# Patient Record
Sex: FEMALE | Race: WHITE | URBAN - METROPOLITAN AREA
[De-identification: names, ages, dates, MRNs, and addresses within clinical notes are randomized per-mention and may not be internally consistent; named-entity substitution may affect disease eponyms.]

---

## 2022-04-13 ENCOUNTER — WEB ENCOUNTER (OUTPATIENT)
Dept: URBAN - METROPOLITAN AREA CLINIC 25 | Facility: CLINIC | Age: 82
End: 2022-04-13

## 2022-04-15 ENCOUNTER — OFFICE VISIT (OUTPATIENT)
Dept: URBAN - METROPOLITAN AREA CLINIC 25 | Facility: CLINIC | Age: 82
End: 2022-04-15

## 2022-04-25 ENCOUNTER — TELEPHONE ENCOUNTER (OUTPATIENT)
Dept: URBAN - METROPOLITAN AREA CLINIC 25 | Facility: CLINIC | Age: 82
End: 2022-04-25

## 2022-04-25 ENCOUNTER — LAB OUTSIDE AN ENCOUNTER (OUTPATIENT)
Dept: URBAN - METROPOLITAN AREA CLINIC 25 | Facility: CLINIC | Age: 82
End: 2022-04-25

## 2022-04-25 ENCOUNTER — OFFICE VISIT (OUTPATIENT)
Dept: URBAN - METROPOLITAN AREA CLINIC 25 | Facility: CLINIC | Age: 82
End: 2022-04-25
Payer: MEDICARE

## 2022-04-25 DIAGNOSIS — K86.89 DILATED PANCREATIC DUCT: ICD-10-CM

## 2022-04-25 DIAGNOSIS — Z79.01 ANTICOAGULANT LONG-TERM USE: ICD-10-CM

## 2022-04-25 DIAGNOSIS — Z86.711 HISTORY OF PULMONARY EMBOLISM: ICD-10-CM

## 2022-04-25 PROCEDURE — 99204 OFFICE O/P NEW MOD 45 MIN: CPT | Performed by: INTERNAL MEDICINE

## 2022-04-25 NOTE — HPI-TODAY'S VISIT:
April 2022 visit:  History of pulmonary embolism, on anticoagulation since January 22.  CAT scan of the chest abdomen and pelvis were performed to evaluate unprovoked PE which identified significant dilation of the pancreatic duct without any other pancreatic lesion.  Complex cysts in the kidneys.  Pancreatic duct is dilated to 1 cm.  Morphological changes of underlying liver disease including atrophia of the medial left lobe and hypertrophy of the left lateral lobe.  A liver cyst measuring 1.2 cm with some internal septations also seen.  Labs revealed a hemoglobin of 11, normal MCV, normal bilirubin, ALT, AST, alkaline phosphatase  No family history of colon cancer / GI malignancies / IBD  last colonoscopy was 4-5 yrs ago which was normal.   on eliquis  No Heartburn, Dysphagia, Melena, Rectal bleeding, Weight loss, Diarrhea, Constipation, Abdominal pain.  on PPI for several years. controls gerd. EGD 4-5 yrs ago.  Underwent a MRI abdomen with and without contrast in March 2022 which revealed pancreatic ductal dilation 0.8 cm within the head and neck, no definitive pancreatic lesion, moderate pancreatic atrophy.  Right renal lesion with internal septation and a repeat MRI kidneys recommended in 6 months.  Also mention of a liver cyst at segment 7 measuring 1.5 cm, normal gallbladder.

## 2022-06-01 ENCOUNTER — WEB ENCOUNTER (OUTPATIENT)
Dept: URBAN - METROPOLITAN AREA CLINIC 25 | Facility: CLINIC | Age: 82
End: 2022-06-01

## 2022-06-01 RX ORDER — ONDANSETRON 4 MG/1
1 TABLET ON THE TONGUE AND ALLOW TO DISSOLVE TABLET, ORALLY DISINTEGRATING ORAL
Qty: 30 | Refills: 0 | OUTPATIENT
Start: 2022-06-01

## 2022-06-02 ENCOUNTER — WEB ENCOUNTER (OUTPATIENT)
Dept: URBAN - METROPOLITAN AREA CLINIC 25 | Facility: CLINIC | Age: 82
End: 2022-06-02

## 2022-06-03 ENCOUNTER — LAB OUTSIDE AN ENCOUNTER (OUTPATIENT)
Dept: URBAN - METROPOLITAN AREA CLINIC 25 | Facility: CLINIC | Age: 82
End: 2022-06-03

## 2022-06-03 ENCOUNTER — OFFICE VISIT (OUTPATIENT)
Dept: URBAN - METROPOLITAN AREA CLINIC 25 | Facility: CLINIC | Age: 82
End: 2022-06-03
Payer: MEDICARE

## 2022-06-03 ENCOUNTER — TELEPHONE ENCOUNTER (OUTPATIENT)
Dept: URBAN - METROPOLITAN AREA CLINIC 92 | Facility: CLINIC | Age: 82
End: 2022-06-03

## 2022-06-03 DIAGNOSIS — K86.89 DILATED PANCREATIC DUCT: ICD-10-CM

## 2022-06-03 DIAGNOSIS — R11.0 NAUSEA: ICD-10-CM

## 2022-06-03 DIAGNOSIS — Z79.01 ANTICOAGULANT LONG-TERM USE: ICD-10-CM

## 2022-06-03 DIAGNOSIS — Z86.711 HISTORY OF PULMONARY EMBOLISM: ICD-10-CM

## 2022-06-03 DIAGNOSIS — R10.84 GENERALIZED ABDOMINAL PAIN: ICD-10-CM

## 2022-06-03 PROCEDURE — 99214 OFFICE O/P EST MOD 30 MIN: CPT | Performed by: INTERNAL MEDICINE

## 2022-06-03 RX ORDER — ONDANSETRON 4 MG/1
1 TABLET ON THE TONGUE AND ALLOW TO DISSOLVE TABLET, ORALLY DISINTEGRATING ORAL
Qty: 30 | Refills: 0 | Status: ACTIVE | COMMUNITY
Start: 2022-06-01

## 2022-06-03 NOTE — HPI-TODAY'S VISIT:
June 2022:  nausea X 4 days. no emesis. started to cut back omeprazole based on prior GI recs ( dr elías agudelo). on eliquis. stopped all meds X 4 days. doing mostly liquids. zofran doesnt help. no abdominal pain. passing flatus and having trace BM. no fever. last egd was in 2016. mild abdominal distension. mild pain.

## 2022-06-06 PROBLEM — 422587007 NAUSEA: Status: ACTIVE | Noted: 2022-06-06

## 2022-06-07 ENCOUNTER — OFFICE VISIT (OUTPATIENT)
Dept: URBAN - METROPOLITAN AREA SURGERY CENTER 20 | Facility: SURGERY CENTER | Age: 82
End: 2022-06-07
Payer: MEDICARE

## 2022-06-07 ENCOUNTER — TELEPHONE ENCOUNTER (OUTPATIENT)
Dept: URBAN - METROPOLITAN AREA CLINIC 92 | Facility: CLINIC | Age: 82
End: 2022-06-07

## 2022-06-07 ENCOUNTER — CLAIMS CREATED FROM THE CLAIM WINDOW (OUTPATIENT)
Dept: URBAN - METROPOLITAN AREA CLINIC 4 | Facility: CLINIC | Age: 82
End: 2022-06-07
Payer: MEDICARE

## 2022-06-07 DIAGNOSIS — K31.89 ACQUIRED DEFORMITY OF DUODENUM: ICD-10-CM

## 2022-06-07 DIAGNOSIS — K29.60 ADENOPAPILLOMATOSIS GASTRICA: ICD-10-CM

## 2022-06-07 DIAGNOSIS — K31.7 POLYP OF STOMACH AND DUODENUM: ICD-10-CM

## 2022-06-07 DIAGNOSIS — K29.60 OTHER GASTRITIS WITHOUT BLEEDING: ICD-10-CM

## 2022-06-07 DIAGNOSIS — K31.7 BENIGN GASTRIC POLYP: ICD-10-CM

## 2022-06-07 PROCEDURE — 43239 EGD BIOPSY SINGLE/MULTIPLE: CPT | Performed by: INTERNAL MEDICINE

## 2022-06-07 PROCEDURE — G8907 PT DOC NO EVENTS ON DISCHARG: HCPCS | Performed by: INTERNAL MEDICINE

## 2022-06-07 PROCEDURE — 88305 TISSUE EXAM BY PATHOLOGIST: CPT | Performed by: PATHOLOGY

## 2022-06-07 PROCEDURE — 88312 SPECIAL STAINS GROUP 1: CPT | Performed by: PATHOLOGY

## 2022-06-07 PROCEDURE — 88342 IMHCHEM/IMCYTCHM 1ST ANTB: CPT | Performed by: PATHOLOGY

## 2022-06-07 RX ORDER — ONDANSETRON 4 MG/1
1 TABLET ON THE TONGUE AND ALLOW TO DISSOLVE TABLET, ORALLY DISINTEGRATING ORAL
Qty: 30 | Refills: 0 | Status: ACTIVE | COMMUNITY
Start: 2022-06-01

## 2022-06-08 ENCOUNTER — TELEPHONE ENCOUNTER (OUTPATIENT)
Dept: URBAN - METROPOLITAN AREA CLINIC 92 | Facility: CLINIC | Age: 82
End: 2022-06-08

## 2022-06-15 ENCOUNTER — TELEPHONE ENCOUNTER (OUTPATIENT)
Dept: URBAN - METROPOLITAN AREA CLINIC 92 | Facility: CLINIC | Age: 82
End: 2022-06-15

## 2022-09-16 ENCOUNTER — WEB ENCOUNTER (OUTPATIENT)
Dept: URBAN - METROPOLITAN AREA CLINIC 25 | Facility: CLINIC | Age: 82
End: 2022-09-16

## 2022-09-16 ENCOUNTER — OFFICE VISIT (OUTPATIENT)
Dept: URBAN - METROPOLITAN AREA CLINIC 25 | Facility: CLINIC | Age: 82
End: 2022-09-16
Payer: MEDICARE

## 2022-09-16 ENCOUNTER — TELEPHONE ENCOUNTER (OUTPATIENT)
Dept: URBAN - METROPOLITAN AREA CLINIC 92 | Facility: CLINIC | Age: 82
End: 2022-09-16

## 2022-09-16 VITALS
DIASTOLIC BLOOD PRESSURE: 84 MMHG | BODY MASS INDEX: 26.87 KG/M2 | TEMPERATURE: 97.7 F | HEIGHT: 66 IN | HEART RATE: 70 BPM | WEIGHT: 167.2 LBS | SYSTOLIC BLOOD PRESSURE: 154 MMHG

## 2022-09-16 DIAGNOSIS — K31.89 DUODENAL NODULE: ICD-10-CM

## 2022-09-16 DIAGNOSIS — K86.89 DILATED PANCREATIC DUCT: ICD-10-CM

## 2022-09-16 DIAGNOSIS — Z86.711 HISTORY OF PULMONARY EMBOLISM: ICD-10-CM

## 2022-09-16 DIAGNOSIS — K25.9 GASTRIC EROSION DETERMINED BY ENDOSCOPY: ICD-10-CM

## 2022-09-16 DIAGNOSIS — Z79.01 ANTICOAGULANT LONG-TERM USE: ICD-10-CM

## 2022-09-16 PROCEDURE — 99214 OFFICE O/P EST MOD 30 MIN: CPT | Performed by: INTERNAL MEDICINE

## 2022-09-16 RX ORDER — ONDANSETRON 4 MG/1
1 TABLET ON THE TONGUE AND ALLOW TO DISSOLVE TABLET, ORALLY DISINTEGRATING ORAL
Qty: 30 | Refills: 0 | Status: ACTIVE | COMMUNITY
Start: 2022-06-01

## 2022-09-16 NOTE — HPI-TODAY'S VISIT:
September 22 visit: Patient had EGD in June 22 which revealed 2 small gastric antral erosions, multiple small benign-appearing polyps in the gastric fundus, a 10 mm nodule distal to the ampulla in the duodenum which is benign-appearing, path from stomach revealed pill induced gastritis, path from gastric polyps revealed benign fundic gland polyps.  Patient was advised to undergo endoscopic ultrasound evaluation.  on eliquis.  no GI symptoms. on omeprazole 20 mg once a day.

## 2022-09-16 NOTE — HPI-OTHER HISTORIES
June 2022:  nausea X 4 days. no emesis. started to cut back omeprazole based on prior GI recs ( dr elías agudelo). on eliquis. stopped all meds X 4 days. doing mostly liquids. zofran doesnt help. no abdominal pain. passing flatus and having trace BM. no fever. last egd was in 2016. mild abdominal distension. mild pain.  April 2022 visit:  History of pulmonary embolism, on anticoagulation since January 22.  CAT scan of the chest abdomen and pelvis were performed to evaluate unprovoked PE which identified significant dilation of the pancreatic duct without any other pancreatic lesion.  Complex cysts in the kidneys.  Pancreatic duct is dilated to 1 cm.  Morphological changes of underlying liver disease including atrophia of the medial left lobe and hypertrophy of the left lateral lobe.  A liver cyst measuring 1.2 cm with some internal septations also seen.  Labs revealed a hemoglobin of 11, normal MCV, normal bilirubin, ALT, AST, alkaline phosphatase  No family history of colon cancer / GI malignancies / IBD  last colonoscopy was 4-5 yrs ago which was normal.   on eliquis  No Heartburn, Dysphagia, Melena, Rectal bleeding, Weight loss, Diarrhea, Constipation, Abdominal pain.  on PPI for several years. controls gerd. EGD 4-5 yrs ago.  Underwent a MRI abdomen with and without contrast in March 2022 which revealed pancreatic ductal dilation 0.8 cm within the head and neck, no definitive pancreatic lesion, moderate pancreatic atrophy.  Right renal lesion with internal septation and a repeat MRI kidneys recommended in 6 months.  Also mention of a liver cyst at segment 7 measuring 1.5 cm, normal gallbladder.

## 2022-09-19 ENCOUNTER — TELEPHONE ENCOUNTER (OUTPATIENT)
Dept: URBAN - METROPOLITAN AREA CLINIC 92 | Facility: CLINIC | Age: 82
End: 2022-09-19

## 2022-10-21 ENCOUNTER — OFFICE VISIT (OUTPATIENT)
Dept: URBAN - METROPOLITAN AREA MEDICAL CENTER 28 | Facility: MEDICAL CENTER | Age: 82
End: 2022-10-21
Payer: MEDICARE

## 2022-10-21 ENCOUNTER — TELEPHONE ENCOUNTER (OUTPATIENT)
Dept: URBAN - METROPOLITAN AREA CLINIC 92 | Facility: CLINIC | Age: 82
End: 2022-10-21

## 2022-10-21 DIAGNOSIS — R93.3 ABN FINDINGS-GI TRACT: ICD-10-CM

## 2022-10-21 DIAGNOSIS — K29.60 ADENOPAPILLOMATOSIS GASTRICA: ICD-10-CM

## 2022-10-21 DIAGNOSIS — K31.89 ACQUIRED DEFORMITY OF DUODENUM: ICD-10-CM

## 2022-10-21 DIAGNOSIS — K86.89 ACUTE PANCREATIC FLUID COLLECTION: ICD-10-CM

## 2022-10-21 PROCEDURE — 43239 EGD BIOPSY SINGLE/MULTIPLE: CPT | Performed by: INTERNAL MEDICINE

## 2022-10-21 PROCEDURE — 43259 EGD US EXAM DUODENUM/JEJUNUM: CPT | Performed by: INTERNAL MEDICINE

## 2022-10-21 RX ORDER — ONDANSETRON 4 MG/1
1 TABLET ON THE TONGUE AND ALLOW TO DISSOLVE TABLET, ORALLY DISINTEGRATING ORAL
Qty: 30 | Refills: 0 | Status: ACTIVE | COMMUNITY
Start: 2022-06-01

## 2022-11-14 ENCOUNTER — OFFICE VISIT (OUTPATIENT)
Dept: URBAN - METROPOLITAN AREA CLINIC 25 | Facility: CLINIC | Age: 82
End: 2022-11-14
Payer: MEDICARE

## 2022-11-14 VITALS
BODY MASS INDEX: 27.16 KG/M2 | WEIGHT: 169 LBS | DIASTOLIC BLOOD PRESSURE: 75 MMHG | HEART RATE: 72 BPM | HEIGHT: 66 IN | TEMPERATURE: 97.2 F | SYSTOLIC BLOOD PRESSURE: 149 MMHG

## 2022-11-14 DIAGNOSIS — Z79.01 ANTICOAGULANT LONG-TERM USE: ICD-10-CM

## 2022-11-14 DIAGNOSIS — Z86.711 HISTORY OF PULMONARY EMBOLISM: ICD-10-CM

## 2022-11-14 DIAGNOSIS — K25.9 GASTRIC EROSION DETERMINED BY ENDOSCOPY: ICD-10-CM

## 2022-11-14 DIAGNOSIS — K31.89 DUODENAL NODULE: ICD-10-CM

## 2022-11-14 DIAGNOSIS — K86.89 DILATED PANCREATIC DUCT: ICD-10-CM

## 2022-11-14 DIAGNOSIS — D49.0 IPMN (INTRADUCTAL PAPILLARY MUCINOUS NEOPLASM): ICD-10-CM

## 2022-11-14 PROBLEM — 161512007 HISTORY OF PULMONARY EMBOLUS: Status: ACTIVE | Noted: 2022-04-25

## 2022-11-14 PROBLEM — 59913009 GASTRIC ULCER WITHOUT HEMORRHAGE, WITHOUT PERFORATION AND WITHOUT OBSTRUCTION: Status: ACTIVE | Noted: 2022-09-16

## 2022-11-14 PROBLEM — 711150003 LONG-TERM CURRENT USE OF ANTICOAGULANT: Status: ACTIVE | Noted: 2022-04-25

## 2022-11-14 PROCEDURE — 99213 OFFICE O/P EST LOW 20 MIN: CPT | Performed by: INTERNAL MEDICINE

## 2022-11-14 RX ORDER — ONDANSETRON 4 MG/1
1 TABLET ON THE TONGUE AND ALLOW TO DISSOLVE TABLET, ORALLY DISINTEGRATING ORAL
Qty: 30 | Refills: 0 | Status: ACTIVE | COMMUNITY
Start: 2022-06-01

## 2022-11-14 NOTE — HPI-OTHER HISTORIES
September 22 visit: Patient had EGD in June 22 which revealed 2 small gastric antral erosions, multiple small benign-appearing polyps in the gastric fundus, a 10 mm nodule distal to the ampulla in the duodenum which is benign-appearing, path from stomach revealed pill induced gastritis, path from gastric polyps revealed benign fundic gland polyps.  Patient was advised to undergo endoscopic ultrasound evaluation.  on eliquis.  no GI symptoms. on omeprazole 20 mg once a day.  June 2022:  nausea X 4 days. no emesis. started to cut back omeprazole based on prior GI recs ( dr elías agudelo). on eliquis. stopped all meds X 4 days. doing mostly liquids. zofran doesnt help. no abdominal pain. passing flatus and having trace BM. no fever. last egd was in 2016. mild abdominal distension. mild pain.  April 2022 visit:  History of pulmonary embolism, on anticoagulation since January 22.  CAT scan of the chest abdomen and pelvis were performed to evaluate unprovoked PE which identified significant dilation of the pancreatic duct without any other pancreatic lesion.  Complex cysts in the kidneys.  Pancreatic duct is dilated to 1 cm.  Morphological changes of underlying liver disease including atrophia of the medial left lobe and hypertrophy of the left lateral lobe.  A liver cyst measuring 1.2 cm with some internal septations also seen.  Labs revealed a hemoglobin of 11, normal MCV, normal bilirubin, ALT, AST, alkaline phosphatase  No family history of colon cancer / GI malignancies / IBD  last colonoscopy was 4-5 yrs ago which was normal.   on eliquis  No Heartburn, Dysphagia, Melena, Rectal bleeding, Weight loss, Diarrhea, Constipation, Abdominal pain.  on PPI for several years. controls gerd. EGD 4-5 yrs ago.  Underwent a MRI abdomen with and without contrast in March 2022 which revealed pancreatic ductal dilation 0.8 cm within the head and neck, no definitive pancreatic lesion, moderate pancreatic atrophy.  Right renal lesion with internal septation and a repeat MRI kidneys recommended in 6 months.  Also mention of a liver cyst at segment 7 measuring 1.5 cm, normal gallbladder.

## 2022-11-14 NOTE — HPI-TODAY'S VISIT:
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  No chronic pancreatitis, no pancreatic ductal stone, gastric biopsy revealed chronic inactive gastritis with no H. pylori.  No Heartburn, Dysphagia, Melena, Rectal bleeding, Weight loss, Diarrhea, Constipation, Abdominal pain.

## 2023-03-27 ENCOUNTER — WEB ENCOUNTER (OUTPATIENT)
Dept: URBAN - METROPOLITAN AREA CLINIC 25 | Facility: CLINIC | Age: 83
End: 2023-03-27

## 2023-03-27 ENCOUNTER — LAB OUTSIDE AN ENCOUNTER (OUTPATIENT)
Dept: URBAN - METROPOLITAN AREA CLINIC 25 | Facility: CLINIC | Age: 83
End: 2023-03-27

## 2023-03-27 ENCOUNTER — OFFICE VISIT (OUTPATIENT)
Dept: URBAN - METROPOLITAN AREA CLINIC 25 | Facility: CLINIC | Age: 83
End: 2023-03-27
Payer: MEDICARE

## 2023-03-27 VITALS
HEIGHT: 64 IN | DIASTOLIC BLOOD PRESSURE: 83 MMHG | WEIGHT: 172 LBS | BODY MASS INDEX: 29.37 KG/M2 | HEART RATE: 79 BPM | TEMPERATURE: 98.9 F | SYSTOLIC BLOOD PRESSURE: 161 MMHG

## 2023-03-27 DIAGNOSIS — D49.0 IPMN (INTRADUCTAL PAPILLARY MUCINOUS NEOPLASM): ICD-10-CM

## 2023-03-27 DIAGNOSIS — Z86.711 HISTORY OF PULMONARY EMBOLISM: ICD-10-CM

## 2023-03-27 DIAGNOSIS — K31.89 DUODENAL NODULE: ICD-10-CM

## 2023-03-27 DIAGNOSIS — K25.9 GASTRIC EROSION DETERMINED BY ENDOSCOPY: ICD-10-CM

## 2023-03-27 DIAGNOSIS — K86.89 DILATED PANCREATIC DUCT: ICD-10-CM

## 2023-03-27 DIAGNOSIS — Z79.01 ANTICOAGULANT LONG-TERM USE: ICD-10-CM

## 2023-03-27 PROCEDURE — 99213 OFFICE O/P EST LOW 20 MIN: CPT | Performed by: INTERNAL MEDICINE

## 2023-03-27 RX ORDER — ONDANSETRON 4 MG/1
1 TABLET ON THE TONGUE AND ALLOW TO DISSOLVE TABLET, ORALLY DISINTEGRATING ORAL
Qty: 30 | Refills: 0 | COMMUNITY
Start: 2022-06-01

## 2023-03-27 NOTE — HPI-OTHER HISTORIES
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  No chronic pancreatitis, no pancreatic ductal stone, gastric biopsy revealed chronic inactive gastritis with no H. pylori.  No Heartburn, Dysphagia, Melena, Rectal bleeding, Weight loss, Diarrhea, Constipation, Abdominal pain.  September 22 visit: Patient had EGD in June 22 which revealed 2 small gastric antral erosions, multiple small benign-appearing polyps in the gastric fundus, a 10 mm nodule distal to the ampulla in the duodenum which is benign-appearing, path from stomach revealed pill induced gastritis, path from gastric polyps revealed benign fundic gland polyps.  Patient was advised to undergo endoscopic ultrasound evaluation.  on eliquis.  no GI symptoms. on omeprazole 20 mg once a day.  June 2022:  nausea X 4 days. no emesis. started to cut back omeprazole based on prior GI recs ( dr elías agudelo). on eliquis. stopped all meds X 4 days. doing mostly liquids. zofran doesnt help. no abdominal pain. passing flatus and having trace BM. no fever. last egd was in 2016. mild abdominal distension. mild pain.  April 2022 visit:  History of pulmonary embolism, on anticoagulation since January 22.  CAT scan of the chest abdomen and pelvis were performed to evaluate unprovoked PE which identified significant dilation of the pancreatic duct without any other pancreatic lesion.  Complex cysts in the kidneys.  Pancreatic duct is dilated to 1 cm.  Morphological changes of underlying liver disease including atrophia of the medial left lobe and hypertrophy of the left lateral lobe.  A liver cyst measuring 1.2 cm with some internal septations also seen.  Labs revealed a hemoglobin of 11, normal MCV, normal bilirubin, ALT, AST, alkaline phosphatase  No family history of colon cancer / GI malignancies / IBD  last colonoscopy was 4-5 yrs ago which was normal.   on eliquis  No Heartburn, Dysphagia, Melena, Rectal bleeding, Weight loss, Diarrhea, Constipation, Abdominal pain.  on PPI for several years. controls gerd. EGD 4-5 yrs ago.  Underwent a MRI abdomen with and without contrast in March 2022 which revealed pancreatic ductal dilation 0.8 cm within the head and neck, no definitive pancreatic lesion, moderate pancreatic atrophy.  Right renal lesion with internal septation and a repeat MRI kidneys recommended in 6 months.  Also mention of a liver cyst at segment 7 measuring 1.5 cm, normal gallbladder.

## 2023-10-30 ENCOUNTER — LAB OUTSIDE AN ENCOUNTER (OUTPATIENT)
Dept: URBAN - METROPOLITAN AREA CLINIC 25 | Facility: CLINIC | Age: 83
End: 2023-10-30

## 2023-10-30 ENCOUNTER — OFFICE VISIT (OUTPATIENT)
Dept: URBAN - METROPOLITAN AREA CLINIC 25 | Facility: CLINIC | Age: 83
End: 2023-10-30
Payer: MEDICARE

## 2023-10-30 VITALS
WEIGHT: 172 LBS | SYSTOLIC BLOOD PRESSURE: 150 MMHG | HEART RATE: 68 BPM | BODY MASS INDEX: 27.64 KG/M2 | HEIGHT: 66 IN | DIASTOLIC BLOOD PRESSURE: 73 MMHG | TEMPERATURE: 98.1 F

## 2023-10-30 DIAGNOSIS — Z79.01 ANTICOAGULANT LONG-TERM USE: ICD-10-CM

## 2023-10-30 DIAGNOSIS — K25.9 GASTRIC EROSION DETERMINED BY ENDOSCOPY: ICD-10-CM

## 2023-10-30 DIAGNOSIS — D49.0 IPMN (INTRADUCTAL PAPILLARY MUCINOUS NEOPLASM): ICD-10-CM

## 2023-10-30 DIAGNOSIS — K31.89 DUODENAL NODULE: ICD-10-CM

## 2023-10-30 DIAGNOSIS — Z86.711 HISTORY OF PULMONARY EMBOLISM: ICD-10-CM

## 2023-10-30 DIAGNOSIS — K86.89 DILATED PANCREATIC DUCT: ICD-10-CM

## 2023-10-30 PROCEDURE — 99213 OFFICE O/P EST LOW 20 MIN: CPT | Performed by: INTERNAL MEDICINE

## 2023-10-30 RX ORDER — ONDANSETRON 4 MG/1
1 TABLET ON THE TONGUE AND ALLOW TO DISSOLVE TABLET, ORALLY DISINTEGRATING ORAL
Qty: 30 | Refills: 0 | COMMUNITY
Start: 2022-06-01

## 2023-10-30 NOTE — HPI-TODAY'S VISIT:
October 2023 visit: low energy levels. no abdominal pain. good appetite. not on eliquis. stable weight. MRI June 2023 - stable PD dilation to 8 mm, benign liver cysts.

## 2023-10-30 NOTE — HPI-OTHER HISTORIES
March 2023 visit: no abdominal pain. pt stopped taking eliquis since it was too expensive.  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  No chronic pancreatitis, no pancreatic ductal stone, gastric biopsy revealed chronic inactive gastritis with no H. pylori.  No Heartburn, Dysphagia, Melena, Rectal bleeding, Weight loss, Diarrhea, Constipation, Abdominal pain.  September 22 visit: Patient had EGD in June 22 which revealed 2 small gastric antral erosions, multiple small benign-appearing polyps in the gastric fundus, a 10 mm nodule distal to the ampulla in the duodenum which is benign-appearing, path from stomach revealed pill induced gastritis, path from gastric polyps revealed benign fundic gland polyps.  Patient was advised to undergo endoscopic ultrasound evaluation.  on eliquis.  no GI symptoms. on omeprazole 20 mg once a day.  June 2022:  nausea X 4 days. no emesis. started to cut back omeprazole based on prior GI recs ( dr elías agudelo). on eliquis. stopped all meds X 4 days. doing mostly liquids. zofran doesnt help. no abdominal pain. passing flatus and having trace BM. no fever. last egd was in 2016. mild abdominal distension. mild pain.  April 2022 visit:  History of pulmonary embolism, on anticoagulation since January 22.  CAT scan of the chest abdomen and pelvis were performed to evaluate unprovoked PE which identified significant dilation of the pancreatic duct without any other pancreatic lesion.  Complex cysts in the kidneys.  Pancreatic duct is dilated to 1 cm.  Morphological changes of underlying liver disease including atrophia of the medial left lobe and hypertrophy of the left lateral lobe.  A liver cyst measuring 1.2 cm with some internal septations also seen.  Labs revealed a hemoglobin of 11, normal MCV, normal bilirubin, ALT, AST, alkaline phosphatase  No family history of colon cancer / GI malignancies / IBD  last colonoscopy was 4-5 yrs ago which was normal.   on eliquis  No Heartburn, Dysphagia, Melena, Rectal bleeding, Weight loss, Diarrhea, Constipation, Abdominal pain.  on PPI for several years. controls gerd. EGD 4-5 yrs ago.  Underwent a MRI abdomen with and without contrast in March 2022 which revealed pancreatic ductal dilation 0.8 cm within the head and neck, no definitive pancreatic lesion, moderate pancreatic atrophy.  Right renal lesion with internal septation and a repeat MRI kidneys recommended in 6 months.  Also mention of a liver cyst at segment 7 measuring 1.5 cm, normal gallbladder.

## 2024-04-29 ENCOUNTER — OV EP (OUTPATIENT)
Dept: URBAN - METROPOLITAN AREA CLINIC 25 | Facility: CLINIC | Age: 84
End: 2024-04-29
Payer: MEDICARE

## 2024-04-29 ENCOUNTER — LAB (OUTPATIENT)
Dept: URBAN - METROPOLITAN AREA CLINIC 25 | Facility: CLINIC | Age: 84
End: 2024-04-29

## 2024-04-29 VITALS
SYSTOLIC BLOOD PRESSURE: 1345 MMHG | HEIGHT: 66 IN | TEMPERATURE: 98.1 F | DIASTOLIC BLOOD PRESSURE: 75 MMHG | WEIGHT: 176.6 LBS | BODY MASS INDEX: 28.38 KG/M2 | HEART RATE: 64 BPM

## 2024-04-29 DIAGNOSIS — K86.89 DILATED PANCREATIC DUCT: ICD-10-CM

## 2024-04-29 DIAGNOSIS — D49.0 IPMN (INTRADUCTAL PAPILLARY MUCINOUS NEOPLASM): ICD-10-CM

## 2024-04-29 DIAGNOSIS — K25.9 GASTRIC EROSION DETERMINED BY ENDOSCOPY: ICD-10-CM

## 2024-04-29 DIAGNOSIS — Z79.01 ANTICOAGULANT LONG-TERM USE: ICD-10-CM

## 2024-04-29 DIAGNOSIS — K31.89 DUODENAL NODULE: ICD-10-CM

## 2024-04-29 DIAGNOSIS — Z86.711 HISTORY OF PULMONARY EMBOLISM: ICD-10-CM

## 2024-04-29 PROCEDURE — 99213 OFFICE O/P EST LOW 20 MIN: CPT | Performed by: INTERNAL MEDICINE

## 2024-04-29 RX ORDER — ONDANSETRON 4 MG/1
1 TABLET ON THE TONGUE AND ALLOW TO DISSOLVE TABLET, ORALLY DISINTEGRATING ORAL
Qty: 30 | Refills: 0 | COMMUNITY
Start: 2022-06-01

## 2024-04-29 NOTE — HPI-OTHER HISTORIES
October 2023 visit: low energy levels. no abdominal pain. good appetite. not on eliquis. stable weight. MRI June 2023 - stable PD dilation to 8 mm, benign liver cysts.  March 2023 visit: no abdominal pain. pt stopped taking eliquis since it was too expensive.  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  No chronic pancreatitis, no pancreatic ductal stone, gastric biopsy revealed chronic inactive gastritis with no H. pylori.  No Heartburn, Dysphagia, Melena, Rectal bleeding, Weight loss, Diarrhea, Constipation, Abdominal pain.  September 22 visit: Patient had EGD in June 22 which revealed 2 small gastric antral erosions, multiple small benign-appearing polyps in the gastric fundus, a 10 mm nodule distal to the ampulla in the duodenum which is benign-appearing, path from stomach revealed pill induced gastritis, path from gastric polyps revealed benign fundic gland polyps.  Patient was advised to undergo endoscopic ultrasound evaluation.  on eliquis.  no GI symptoms. on omeprazole 20 mg once a day.  June 2022:  nausea X 4 days. no emesis. started to cut back omeprazole based on prior GI recs ( dr elías agudelo). on eliquis. stopped all meds X 4 days. doing mostly liquids. zofran doesnt help. no abdominal pain. passing flatus and having trace BM. no fever. last egd was in 2016. mild abdominal distension. mild pain.  April 2022 visit:  History of pulmonary embolism, on anticoagulation since January 22.  CAT scan of the chest abdomen and pelvis were performed to evaluate unprovoked PE which identified significant dilation of the pancreatic duct without any other pancreatic lesion.  Complex cysts in the kidneys.  Pancreatic duct is dilated to 1 cm.  Morphological changes of underlying liver disease including atrophia of the medial left lobe and hypertrophy of the left lateral lobe.  A liver cyst measuring 1.2 cm with some internal septations also seen.  Labs revealed a hemoglobin of 11, normal MCV, normal bilirubin, ALT, AST, alkaline phosphatase  No family history of colon cancer / GI malignancies / IBD  last colonoscopy was 4-5 yrs ago which was normal.   on eliquis  No Heartburn, Dysphagia, Melena, Rectal bleeding, Weight loss, Diarrhea, Constipation, Abdominal pain.  on PPI for several years. controls gerd. EGD 4-5 yrs ago.  Underwent a MRI abdomen with and without contrast in March 2022 which revealed pancreatic ductal dilation 0.8 cm within the head and neck, no definitive pancreatic lesion, moderate pancreatic atrophy.  Right renal lesion with internal septation and a repeat MRI kidneys recommended in 6 months.  Also mention of a liver cyst at segment 7 measuring 1.5 cm, normal gallbladder.

## 2024-04-29 NOTE — HPI-TODAY'S VISIT:
April 2024 visit : no GI symptoms. no abdominal pain. stable weight. off eliquis. we ordered MRI pancreas at last visit but this wasnt done.

## 2024-05-08 ENCOUNTER — TELEPHONE ENCOUNTER (OUTPATIENT)
Dept: URBAN - METROPOLITAN AREA CLINIC 25 | Facility: CLINIC | Age: 84
End: 2024-05-08

## 2024-05-10 ENCOUNTER — WEB ENCOUNTER (OUTPATIENT)
Dept: URBAN - METROPOLITAN AREA CLINIC 25 | Facility: CLINIC | Age: 84
End: 2024-05-10

## 2024-10-21 ENCOUNTER — DASHBOARD ENCOUNTERS (OUTPATIENT)
Age: 84
End: 2024-10-21

## 2024-10-21 ENCOUNTER — OFFICE VISIT (OUTPATIENT)
Dept: URBAN - METROPOLITAN AREA CLINIC 25 | Facility: CLINIC | Age: 84
End: 2024-10-21
Payer: MEDICARE

## 2024-10-21 ENCOUNTER — OFFICE VISIT (OUTPATIENT)
Dept: URBAN - METROPOLITAN AREA CLINIC 25 | Facility: CLINIC | Age: 84
End: 2024-10-21

## 2024-10-21 VITALS
DIASTOLIC BLOOD PRESSURE: 68 MMHG | HEART RATE: 72 BPM | BODY MASS INDEX: 28.73 KG/M2 | WEIGHT: 178.8 LBS | HEIGHT: 66 IN | TEMPERATURE: 97.7 F | SYSTOLIC BLOOD PRESSURE: 128 MMHG

## 2024-10-21 DIAGNOSIS — K86.89 DILATED PANCREATIC DUCT: ICD-10-CM

## 2024-10-21 DIAGNOSIS — D49.0 IPMN (INTRADUCTAL PAPILLARY MUCINOUS NEOPLASM): ICD-10-CM

## 2024-10-21 DIAGNOSIS — K86.1 CHRONIC PANCREATITIS: ICD-10-CM

## 2024-10-21 DIAGNOSIS — Z86.711 HISTORY OF PULMONARY EMBOLISM: ICD-10-CM

## 2024-10-21 PROCEDURE — 99214 OFFICE O/P EST MOD 30 MIN: CPT | Performed by: INTERNAL MEDICINE

## 2024-10-21 RX ORDER — ONDANSETRON 4 MG/1
1 TABLET ON THE TONGUE AND ALLOW TO DISSOLVE TABLET, ORALLY DISINTEGRATING ORAL
Qty: 30 | Refills: 0 | COMMUNITY
Start: 2022-06-01

## 2024-10-21 NOTE — HPI-OTHER HISTORIES
April 2024 visit : no GI symptoms. no abdominal pain. stable weight. off eliquis. we ordered MRI pancreas at last visit but this wasnt done.  October 2023 visit: low energy levels. no abdominal pain. good appetite. not on eliquis. stable weight. MRI June 2023 - stable PD dilation to 8 mm, benign liver cysts.  March 2023 visit: no abdominal pain. pt stopped taking eliquis since it was too expensive.  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  No chronic pancreatitis, no pancreatic ductal stone, gastric biopsy revealed chronic inactive gastritis with no H. pylori.  No Heartburn, Dysphagia, Melena, Rectal bleeding, Weight loss, Diarrhea, Constipation, Abdominal pain.  September 22 visit: Patient had EGD in June 22 which revealed 2 small gastric antral erosions, multiple small benign-appearing polyps in the gastric fundus, a 10 mm nodule distal to the ampulla in the duodenum which is benign-appearing, path from stomach revealed pill induced gastritis, path from gastric polyps revealed benign fundic gland polyps.  Patient was advised to undergo endoscopic ultrasound evaluation.  on eliquis.  no GI symptoms. on omeprazole 20 mg once a day.  June 2022:  nausea X 4 days. no emesis. started to cut back omeprazole based on prior GI recs ( dr elías agudelo). on eliquis. stopped all meds X 4 days. doing mostly liquids. zofran doesnt help. no abdominal pain. passing flatus and having trace BM. no fever. last egd was in 2016. mild abdominal distension. mild pain.  April 2022 visit:  History of pulmonary embolism, on anticoagulation since January 22.  CAT scan of the chest abdomen and pelvis were performed to evaluate unprovoked PE which identified significant dilation of the pancreatic duct without any other pancreatic lesion.  Complex cysts in the kidneys.  Pancreatic duct is dilated to 1 cm.  Morphological changes of underlying liver disease including atrophia of the medial left lobe and hypertrophy of the left lateral lobe.  A liver cyst measuring 1.2 cm with some internal septations also seen.  Labs revealed a hemoglobin of 11, normal MCV, normal bilirubin, ALT, AST, alkaline phosphatase  No family history of colon cancer / GI malignancies / IBD  last colonoscopy was 4-5 yrs ago which was normal.   on eliquis  No Heartburn, Dysphagia, Melena, Rectal bleeding, Weight loss, Diarrhea, Constipation, Abdominal pain.  on PPI for several years. controls gerd. EGD 4-5 yrs ago.  Underwent a MRI abdomen with and without contrast in March 2022 which revealed pancreatic ductal dilation 0.8 cm within the head and neck, no definitive pancreatic lesion, moderate pancreatic atrophy.  Right renal lesion with internal septation and a repeat MRI kidneys recommended in 6 months.  Also mention of a liver cyst at segment 7 measuring 1.5 cm, normal gallbladder.

## 2024-10-21 NOTE — HPI-TODAY'S VISIT:
Oct 2024: MRI 5/2024 - stable main PD dilation upto 8 mm. changes of chronic pancreatitis. 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 mild fatty liver. asymptomatic. stable weight. no abdominal pain. taking omeprazole daily.

## 2024-12-09 ENCOUNTER — TELEPHONE ENCOUNTER (OUTPATIENT)
Dept: URBAN - METROPOLITAN AREA CLINIC 25 | Facility: CLINIC | Age: 84
End: 2024-12-09

## 2025-01-21 ENCOUNTER — LAB OUTSIDE AN ENCOUNTER (OUTPATIENT)
Dept: URBAN - METROPOLITAN AREA CLINIC 25 | Facility: CLINIC | Age: 85
End: 2025-01-21

## 2025-04-07 ENCOUNTER — LAB OUTSIDE AN ENCOUNTER (OUTPATIENT)
Dept: URBAN - METROPOLITAN AREA CLINIC 25 | Facility: CLINIC | Age: 85
End: 2025-04-07

## 2025-04-07 ENCOUNTER — OFFICE VISIT (OUTPATIENT)
Dept: URBAN - METROPOLITAN AREA CLINIC 25 | Facility: CLINIC | Age: 85
End: 2025-04-07
Payer: MEDICARE

## 2025-04-07 DIAGNOSIS — K86.89 DILATED PANCREATIC DUCT: ICD-10-CM

## 2025-04-07 DIAGNOSIS — K86.1 CHRONIC PANCREATITIS: ICD-10-CM

## 2025-04-07 DIAGNOSIS — D49.0 IPMN (INTRADUCTAL PAPILLARY MUCINOUS NEOPLASM): ICD-10-CM

## 2025-04-07 DIAGNOSIS — Z86.711 HISTORY OF PULMONARY EMBOLISM: ICD-10-CM

## 2025-04-07 DIAGNOSIS — K86.81 EXOCRINE PANCREATIC INSUFFICIENCY: ICD-10-CM

## 2025-04-07 PROBLEM — 47367009: Status: ACTIVE | Noted: 2025-04-07

## 2025-04-07 PROCEDURE — 99214 OFFICE O/P EST MOD 30 MIN: CPT | Performed by: INTERNAL MEDICINE

## 2025-04-07 RX ORDER — PANCRELIPASE 36000; 180000; 114000 [USP'U]/1; [USP'U]/1; [USP'U]/1
2 CAPS WITH MEALS, AND 1 CAP WITH SNACKS CAPSULE, DELAYED RELEASE PELLETS ORAL
Qty: 750 | Refills: 3 | OUTPATIENT
Start: 2025-04-07

## 2025-04-07 NOTE — HPI-OTHER HISTORIES
Oct 2024: MRI 5/2024 - stable main PD dilation upto 8 mm. changes of chronic pancreatitis. 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 mild fatty liver. asymptomatic. stable weight. no abdominal pain. taking omeprazole daily.  April 2024 visit : no GI symptoms. no abdominal pain. stable weight. off eliquis. we ordered MRI pancreas at last visit but this wasnt done.  October 2023 visit: low energy levels. no abdominal pain. good appetite. not on eliquis. stable weight. MRI June 2023 - stable PD dilation to 8 mm, benign liver cysts.  March 2023 visit: no abdominal pain. pt stopped taking eliquis since it was too expensive.  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  No chronic pancreatitis, no pancreatic ductal stone, gastric biopsy revealed chronic inactive gastritis with no H. pylori.  No Heartburn, Dysphagia, Melena, Rectal bleeding, Weight loss, Diarrhea, Constipation, Abdominal pain.  September 22 visit: Patient had EGD in June 22 which revealed 2 small gastric antral erosions, multiple small benign-appearing polyps in the gastric fundus, a 10 mm nodule distal to the ampulla in the duodenum which is benign-appearing, path from stomach revealed pill induced gastritis, path from gastric polyps revealed benign fundic gland polyps.  Patient was advised to undergo endoscopic ultrasound evaluation.  on eliquis.  no GI symptoms. on omeprazole 20 mg once a day.  June 2022:  nausea X 4 days. no emesis. started to cut back omeprazole based on prior GI recs ( dr elías agudelo). on eliquis. stopped all meds X 4 days. doing mostly liquids. zofran doesnt help. no abdominal pain. passing flatus and having trace BM. no fever. last egd was in 2016. mild abdominal distension. mild pain.  April 2022 visit:  History of pulmonary embolism, on anticoagulation since January 22.  CAT scan of the chest abdomen and pelvis were performed to evaluate unprovoked PE which identified significant dilation of the pancreatic duct without any other pancreatic lesion.  Complex cysts in the kidneys.  Pancreatic duct is dilated to 1 cm.  Morphological changes of underlying liver disease including atrophia of the medial left lobe and hypertrophy of the left lateral lobe.  A liver cyst measuring 1.2 cm with some internal septations also seen.  Labs revealed a hemoglobin of 11, normal MCV, normal bilirubin, ALT, AST, alkaline phosphatase  No family history of colon cancer / GI malignancies / IBD  last colonoscopy was 4-5 yrs ago which was normal.   on eliquis  No Heartburn, Dysphagia, Melena, Rectal bleeding, Weight loss, Diarrhea, Constipation, Abdominal pain.  on PPI for several years. controls gerd. EGD 4-5 yrs ago.  Underwent a MRI abdomen with and without contrast in March 2022 which revealed pancreatic ductal dilation 0.8 cm within the head and neck, no definitive pancreatic lesion, moderate pancreatic atrophy.  Right renal lesion with internal septation and a repeat MRI kidneys recommended in 6 months.  Also mention of a liver cyst at segment 7 measuring 1.5 cm, normal gallbladder.

## 2025-04-07 NOTE — HPI-TODAY'S VISIT:
April 2025 visit: Patient is asymptomatic.  No abdominal symptoms.  MRI from November 2024 continues to show similar changes with main pancreatic ductal dilation along with pancreatic parenchymal thinning and ductal irregularity.  Similar to prior multiple dilated sidebranches.  Pancreatic duct is dilated to the level of the ampulla.  1.5 cm right hepatic liver cyst.  Changes concerning for chronic pancreatitis. takes PPI daily. Slightly looser stools needing to take antidiarrheal meds OTC. GERD  well controlled.

## 2025-04-08 ENCOUNTER — TELEPHONE ENCOUNTER (OUTPATIENT)
Dept: URBAN - METROPOLITAN AREA CLINIC 27 | Facility: CLINIC | Age: 85
End: 2025-04-08

## 2025-04-23 ENCOUNTER — TELEPHONE ENCOUNTER (OUTPATIENT)
Dept: URBAN - METROPOLITAN AREA CLINIC 25 | Facility: CLINIC | Age: 85
End: 2025-04-23